# Patient Record
Sex: MALE | Race: WHITE | NOT HISPANIC OR LATINO | URBAN - METROPOLITAN AREA
[De-identification: names, ages, dates, MRNs, and addresses within clinical notes are randomized per-mention and may not be internally consistent; named-entity substitution may affect disease eponyms.]

---

## 2018-07-14 ENCOUNTER — OFFICE VISIT (OUTPATIENT)
Dept: URGENT CARE | Facility: CLINIC | Age: 73
End: 2018-07-14
Payer: MEDICARE

## 2018-07-14 VITALS
RESPIRATION RATE: 16 BRPM | TEMPERATURE: 98.2 F | WEIGHT: 195 LBS | SYSTOLIC BLOOD PRESSURE: 118 MMHG | HEART RATE: 88 BPM | OXYGEN SATURATION: 96 % | HEIGHT: 67 IN | DIASTOLIC BLOOD PRESSURE: 62 MMHG | BODY MASS INDEX: 30.61 KG/M2

## 2018-07-14 DIAGNOSIS — M10.071 ACUTE IDIOPATHIC GOUT INVOLVING TOE OF RIGHT FOOT: ICD-10-CM

## 2018-07-14 PROCEDURE — 99204 OFFICE O/P NEW MOD 45 MIN: CPT | Performed by: NURSE PRACTITIONER

## 2018-07-14 RX ORDER — FOSINOPRIL SODIUM 20 MG/1
20 TABLET ORAL
COMMUNITY

## 2018-07-14 RX ORDER — METHYLPREDNISOLONE 4 MG/1
4 TABLET ORAL DAILY
Qty: 1 KIT | Refills: 0 | Status: SHIPPED | OUTPATIENT
Start: 2018-07-14

## 2018-07-14 RX ORDER — DOXYCYCLINE HYCLATE 100 MG
100 TABLET ORAL 2 TIMES DAILY
Qty: 14 TAB | Refills: 0 | Status: SHIPPED | OUTPATIENT
Start: 2018-07-14 | End: 2018-07-21

## 2018-07-14 RX ORDER — CLOPIDOGREL BISULFATE 75 MG/1
75 TABLET ORAL
COMMUNITY

## 2018-07-14 RX ORDER — PANTOPRAZOLE SODIUM 40 MG/1
TABLET, DELAYED RELEASE ORAL
COMMUNITY
Start: 2017-09-19

## 2018-07-14 RX ORDER — INDOMETHACIN 50 MG/1
50 CAPSULE ORAL 3 TIMES DAILY
Qty: 20 CAP | Refills: 0 | Status: SHIPPED | OUTPATIENT
Start: 2018-07-14

## 2018-07-14 RX ORDER — CARVEDILOL 12.5 MG/1
12.5 TABLET ORAL
COMMUNITY

## 2018-07-14 ASSESSMENT — ENCOUNTER SYMPTOMS: FEVER: 0

## 2018-07-14 NOTE — PROGRESS NOTES
"Subjective:      Rene Barrios is a 73 y.o. male who presents with Toe Pain ((R) big toe, x 3 days, hurts to walk, swollen and red. )            This is a new problem. Pt reports 3 days of worsening redness, pain and heat to right great toe. States it hurts to walk and bear weight on toe. He is a diabetic and uses insulin regularly. He is unaware of any injury involving this toe recently. He denies any fever or flu-like symptoms. He has not taken any medication for the pain.        Review of Systems   Constitutional: Negative for fever.   Musculoskeletal:        Pain and redness to right great toe   All other systems reviewed and are negative.    History reviewed. No pertinent past medical history. History reviewed. No pertinent surgical history.   Social History     Social History   • Marital status: Single     Spouse name: N/A   • Number of children: N/A   • Years of education: N/A     Occupational History   • Not on file.     Social History Main Topics   • Smoking status: Never Smoker   • Smokeless tobacco: Never Used   • Alcohol use No   • Drug use: No   • Sexual activity: Not on file     Other Topics Concern   • Not on file     Social History Narrative   • No narrative on file          Objective:     /62   Pulse 88   Temp 36.8 °C (98.2 °F)   Resp 16   Ht 1.702 m (5' 7\")   Wt 88.5 kg (195 lb)   SpO2 96%   BMI 30.54 kg/m²      Physical Exam   Constitutional: He is oriented to person, place, and time. Vital signs are normal. He appears well-developed and well-nourished.   HENT:   Head: Normocephalic and atraumatic.   Eyes: EOM are normal. Pupils are equal, round, and reactive to light.   Neck: Normal range of motion.   Cardiovascular: Normal rate and regular rhythm.    Pulmonary/Chest: Effort normal.   Musculoskeletal: Normal range of motion.        Right foot: There is tenderness and swelling.        Feet:    Neurological: He is alert and oriented to person, place, and time.   Skin: Skin is warm " and dry. Capillary refill takes less than 2 seconds.   Psychiatric: He has a normal mood and affect. His speech is normal and behavior is normal. Thought content normal.   Vitals reviewed.              Assessment/Plan:     1. Acute idiopathic gout involving toe of right foot  - doxycycline (VIBRAMYCIN) 100 MG Tab; Take 1 Tab by mouth 2 times a day for 7 days.  Dispense: 14 Tab; Refill: 0  - MethylPREDNISolone (MEDROL DOSEPAK) 4 MG Tablet Therapy Pack; Take 1 Tab by mouth every day.  Dispense: 1 Kit; Refill: 0  - indomethacin (INDOCIN) 50 MG Cap; Take 1 Cap by mouth 3 times a day.  Dispense: 20 Cap; Refill: 0    Discussed with patient since he is diabetic, I cannot completely rule out the possibility this toe is infected so I will cover him with antibiotics as well. He understands  Tylenol as directed if needed for additional pain relief  Strict ER precautions if worsening redness and pain develop, red streaking up foot, or new onset of fevers  Supportive care, differential diagnoses, and indications for immediate follow-up discussed with patient.    Pathogenesis of diagnosis discussed including typical length and natural progression.    Instructed to return to  or nearest emergency department if symptoms fail to improve, for any change in condition, further concerns, or new concerning symptoms.  Patient states understanding of the plan of care and discharge instructions.

## 2020-10-21 ENCOUNTER — NEW PATIENT (OUTPATIENT)
Dept: URBAN - METROPOLITAN AREA CLINIC 44 | Facility: CLINIC | Age: 75
End: 2020-10-21
Payer: MEDICARE

## 2020-10-21 DIAGNOSIS — H40.023 OPEN ANGLE WITH BORDERLINE FINDINGS, HIGH RISK, BILATERAL: ICD-10-CM

## 2020-10-21 DIAGNOSIS — E11.39 TYPE 2 DIABETES MELLITUS WITH OPHTHALMIC COMPLICAT: ICD-10-CM

## 2020-10-21 DIAGNOSIS — H02.055 TRICHIASIS WITHOUT ENTROPION, LEFT LOWER LID: Primary | ICD-10-CM

## 2020-10-21 DIAGNOSIS — Z96.1 PRESENCE OF INTRAOCULAR LENS: ICD-10-CM

## 2020-10-21 DIAGNOSIS — H04.123 TEAR FILM INSUFFICIENCY OF BILATERAL LACRIMAL GLANDS: ICD-10-CM

## 2020-10-21 DIAGNOSIS — Z79.4 LONG TERM (CURRENT) USE OF INSULIN: ICD-10-CM

## 2020-10-21 PROCEDURE — 92134 CPTRZ OPH DX IMG PST SGM RTA: CPT | Performed by: OPTOMETRIST

## 2020-10-21 PROCEDURE — 67820 REVISE EYELASHES: CPT | Performed by: OPTOMETRIST

## 2020-10-21 PROCEDURE — 92004 COMPRE OPH EXAM NEW PT 1/>: CPT | Performed by: OPTOMETRIST

## 2020-10-21 ASSESSMENT — INTRAOCULAR PRESSURE
OD: 10
OS: 10

## 2020-10-21 ASSESSMENT — KERATOMETRY
OD: 42.00
OS: 42.25

## 2020-10-21 ASSESSMENT — VISUAL ACUITY
OS: 20/100
OD: 20/30

## 2020-11-12 ENCOUNTER — NEW PATIENT (OUTPATIENT)
Dept: URBAN - METROPOLITAN AREA CLINIC 44 | Facility: CLINIC | Age: 75
End: 2020-11-12
Payer: MEDICARE

## 2020-11-12 PROCEDURE — 92134 CPTRZ OPH DX IMG PST SGM RTA: CPT | Performed by: OPHTHALMOLOGY

## 2020-11-12 PROCEDURE — 92004 COMPRE OPH EXAM NEW PT 1/>: CPT | Performed by: OPHTHALMOLOGY

## 2020-11-12 RX ORDER — ALUMINUM ZIRCONIUM TRICHLOROHYDREX GLY 0.19 G/G
20 MG STICK TOPICAL
Qty: 0 | Refills: 0 | Status: ACTIVE
Start: 2020-11-12

## 2020-11-12 RX ORDER — ASPIRIN 81 MG/1
81 MG TABLET, COATED ORAL
Qty: 0 | Refills: 0 | Status: ACTIVE
Start: 2020-11-12

## 2020-11-12 ASSESSMENT — INTRAOCULAR PRESSURE
OS: 20
OD: 16

## 2021-08-04 ENCOUNTER — OFFICE VISIT (OUTPATIENT)
Dept: URBAN - METROPOLITAN AREA CLINIC 13 | Facility: CLINIC | Age: 76
End: 2021-08-04
Payer: MEDICARE

## 2021-08-04 DIAGNOSIS — H02.105 ECTROPION OF LEFT LOWER EYELID: ICD-10-CM

## 2021-08-04 PROCEDURE — 92134 CPTRZ OPH DX IMG PST SGM RTA: CPT | Performed by: OPHTHALMOLOGY

## 2021-08-04 PROCEDURE — 92014 COMPRE OPH EXAM EST PT 1/>: CPT | Performed by: OPHTHALMOLOGY

## 2021-08-04 RX ORDER — OFLOXACIN 3 MG/ML
0.3 % SOLUTION/ DROPS OPHTHALMIC
Qty: 5 | Refills: 1 | Status: INACTIVE
Start: 2021-08-04 | End: 2021-11-01

## 2021-08-04 ASSESSMENT — INTRAOCULAR PRESSURE
OS: 18
OD: 14

## 2021-08-04 NOTE — IMPRESSION/PLAN
Impression: Type 2 diab with prolif diab rtnop without macular edema, bi: N51.4754.
s/p PPV, PRP x VH OS 5/15/19 Plan: Exam/OCT reveals no CSDME OU. Condition remains stable OU. Discussed control of blood sugar, blood pressure and cholesterol.  

Return in 1 year, OCT OU

## 2021-08-04 NOTE — IMPRESSION/PLAN
Impression: Dry eye syndrome of bilateral lacrimal glands: H04.123. Plan: Recommend frequent lubricating drops OU.

## 2021-08-04 NOTE — IMPRESSION/PLAN
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 Plan: Patient reports tearing and mucus OS. Likely due to lower eyelid ectropion. Recommend trial with Ofloxacin QID x 10 days.   If no improvement, recommend evaluation with oculoplastics specialist.

## 2021-09-07 ENCOUNTER — OFFICE VISIT (OUTPATIENT)
Dept: URBAN - METROPOLITAN AREA CLINIC 44 | Facility: CLINIC | Age: 76
End: 2021-09-07
Payer: MEDICARE

## 2021-09-07 PROCEDURE — 99214 OFFICE O/P EST MOD 30 MIN: CPT | Performed by: OPTOMETRIST

## 2021-09-07 RX ORDER — PREDNISOLONE ACETATE 10 MG/ML
1 % SUSPENSION/ DROPS OPHTHALMIC
Qty: 10 | Refills: 0 | Status: ACTIVE
Start: 2021-09-07

## 2021-09-07 ASSESSMENT — INTRAOCULAR PRESSURE
OD: 15
OS: 15

## 2021-09-07 NOTE — IMPRESSION/PLAN
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 Plan: Discussed findings with patient. Pt was on ocuflox x 10 days, start pf tid and aft gel watch for dendrite.  Start PF TID OS and artificial tears TID or more OU

## 2021-09-14 ENCOUNTER — OFFICE VISIT (OUTPATIENT)
Dept: URBAN - METROPOLITAN AREA CLINIC 44 | Facility: CLINIC | Age: 76
End: 2021-09-14
Payer: MEDICARE

## 2021-09-14 DIAGNOSIS — H16.122 FILAMENTARY KERATITIS, LEFT EYE: Primary | ICD-10-CM

## 2021-09-14 PROCEDURE — 92012 INTRM OPH EXAM EST PATIENT: CPT | Performed by: OPTOMETRIST

## 2021-09-14 ASSESSMENT — INTRAOCULAR PRESSURE
OS: 13
OD: 13

## 2021-09-16 NOTE — IMPRESSION/PLAN
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 Plan: slow improvement, continue pf bid and gell q2h, no dendrite eye is white, refer to Dr Scott lopes possible ambiodisk ?

## 2021-09-29 ENCOUNTER — OFFICE VISIT (OUTPATIENT)
Dept: URBAN - METROPOLITAN AREA CLINIC 44 | Facility: CLINIC | Age: 76
End: 2021-09-29
Payer: MEDICARE

## 2021-09-29 DIAGNOSIS — H18.513 ENDOTHELIAL CORNEAL DYSTROPHY, BILATERAL: ICD-10-CM

## 2021-09-29 DIAGNOSIS — E11.3593 TYPE 2 DIABETES MELLITUS WITH PROLIFERATIVE DIABETIC RETINOPATHY WITHOUT MACULAR EDEMA, BILATERAL: ICD-10-CM

## 2021-09-29 DIAGNOSIS — H16.223 KERATOCONJUNCTIVITIS SICCA, NOT SPECIFIED AS SJÖGREN'S, BILATERAL: Primary | ICD-10-CM

## 2021-09-29 PROCEDURE — 76514 ECHO EXAM OF EYE THICKNESS: CPT | Performed by: OPHTHALMOLOGY

## 2021-09-29 PROCEDURE — 68761 CLOSE TEAR DUCT OPENING: CPT | Performed by: OPHTHALMOLOGY

## 2021-09-29 PROCEDURE — 92025 CPTRIZED CORNEAL TOPOGRAPHY: CPT | Performed by: OPHTHALMOLOGY

## 2021-09-29 PROCEDURE — 99214 OFFICE O/P EST MOD 30 MIN: CPT | Performed by: OPHTHALMOLOGY

## 2021-09-29 RX ORDER — CYCLOSPORINE 0.5 MG/ML
0.05 % EMULSION OPHTHALMIC
Qty: 180 | Refills: 3 | Status: ACTIVE
Start: 2021-09-29

## 2021-09-29 ASSESSMENT — VISUAL ACUITY
OD: 20/50
OS: 20/150

## 2021-09-29 ASSESSMENT — INTRAOCULAR PRESSURE
OS: 11
OD: 19

## 2021-09-29 NOTE — IMPRESSION/PLAN
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